# Patient Record
Sex: FEMALE | Race: WHITE | NOT HISPANIC OR LATINO
[De-identification: names, ages, dates, MRNs, and addresses within clinical notes are randomized per-mention and may not be internally consistent; named-entity substitution may affect disease eponyms.]

---

## 2017-09-13 ENCOUNTER — RESULT REVIEW (OUTPATIENT)
Age: 47
End: 2017-09-13

## 2017-09-27 ENCOUNTER — APPOINTMENT (OUTPATIENT)
Dept: UROLOGY | Facility: CLINIC | Age: 47
End: 2017-09-27
Payer: MEDICAID

## 2017-09-27 VITALS
BODY MASS INDEX: 29.25 KG/M2 | HEIGHT: 66 IN | WEIGHT: 182 LBS | TEMPERATURE: 98.4 F | HEART RATE: 88 BPM | DIASTOLIC BLOOD PRESSURE: 79 MMHG | SYSTOLIC BLOOD PRESSURE: 133 MMHG | OXYGEN SATURATION: 98 %

## 2017-09-27 VITALS — SYSTOLIC BLOOD PRESSURE: 153 MMHG | DIASTOLIC BLOOD PRESSURE: 82 MMHG

## 2017-09-27 DIAGNOSIS — Z86.59 PERSONAL HISTORY OF OTHER MENTAL AND BEHAVIORAL DISORDERS: ICD-10-CM

## 2017-09-27 DIAGNOSIS — Z86.39 PERSONAL HISTORY OF OTHER ENDOCRINE, NUTRITIONAL AND METABOLIC DISEASE: ICD-10-CM

## 2017-09-27 DIAGNOSIS — K21.9 GASTRO-ESOPHAGEAL REFLUX DISEASE W/OUT ESOPHAGITIS: ICD-10-CM

## 2017-09-27 PROCEDURE — 99204 OFFICE O/P NEW MOD 45 MIN: CPT

## 2017-09-28 PROBLEM — Z86.39 HISTORY OF HIGH CHOLESTEROL: Status: RESOLVED | Noted: 2017-09-28 | Resolved: 2017-09-28

## 2017-09-28 PROBLEM — Z86.59 HISTORY OF DEPRESSION: Status: RESOLVED | Noted: 2017-09-28 | Resolved: 2017-09-28

## 2017-09-28 PROBLEM — K21.9 CHRONIC GERD: Status: RESOLVED | Noted: 2017-09-28 | Resolved: 2017-09-28

## 2017-10-11 ENCOUNTER — RESULT REVIEW (OUTPATIENT)
Age: 47
End: 2017-10-11

## 2017-11-06 ENCOUNTER — APPOINTMENT (OUTPATIENT)
Dept: UROLOGY | Facility: CLINIC | Age: 47
End: 2017-11-06
Payer: MEDICAID

## 2017-11-06 VITALS
WEIGHT: 182 LBS | BODY MASS INDEX: 29.25 KG/M2 | DIASTOLIC BLOOD PRESSURE: 81 MMHG | SYSTOLIC BLOOD PRESSURE: 146 MMHG | HEIGHT: 66 IN

## 2017-11-06 DIAGNOSIS — R31.9 HEMATURIA, UNSPECIFIED: ICD-10-CM

## 2017-11-06 PROCEDURE — 52000 CYSTOURETHROSCOPY: CPT

## 2017-11-26 ENCOUNTER — FORM ENCOUNTER (OUTPATIENT)
Age: 47
End: 2017-11-26

## 2017-11-27 ENCOUNTER — APPOINTMENT (OUTPATIENT)
Dept: CT IMAGING | Facility: CLINIC | Age: 47
End: 2017-11-27
Payer: MEDICAID

## 2017-11-27 ENCOUNTER — OUTPATIENT (OUTPATIENT)
Dept: OUTPATIENT SERVICES | Facility: HOSPITAL | Age: 47
LOS: 1 days | End: 2017-11-27
Payer: MEDICAID

## 2017-11-27 DIAGNOSIS — Z00.8 ENCOUNTER FOR OTHER GENERAL EXAMINATION: ICD-10-CM

## 2017-11-27 PROCEDURE — 74178 CT ABD&PLV WO CNTR FLWD CNTR: CPT

## 2017-11-27 PROCEDURE — 74178 CT ABD&PLV WO CNTR FLWD CNTR: CPT | Mod: 26

## 2017-11-29 LAB
ANION GAP SERPL CALC-SCNC: 20 MMOL/L
BUN SERPL-MCNC: 14 MG/DL
CALCIUM SERPL-MCNC: 9.4 MG/DL
CHLORIDE SERPL-SCNC: 103 MMOL/L
CO2 SERPL-SCNC: 19 MMOL/L
CORE LAB FLUID CYTOLOGY: NORMAL
CREAT SERPL-MCNC: 1.04 MG/DL
GLUCOSE SERPL-MCNC: 64 MG/DL
HCG UR QL: NEGATIVE
POTASSIUM SERPL-SCNC: 3.4 MMOL/L
SODIUM SERPL-SCNC: 142 MMOL/L

## 2018-08-14 ENCOUNTER — EMERGENCY (EMERGENCY)
Facility: HOSPITAL | Age: 48
LOS: 0 days | Discharge: HOME | End: 2018-08-14
Attending: EMERGENCY MEDICINE | Admitting: EMERGENCY MEDICINE
Payer: MEDICAID

## 2018-08-14 VITALS
HEART RATE: 61 BPM | OXYGEN SATURATION: 95 % | TEMPERATURE: 98 F | RESPIRATION RATE: 18 BRPM | SYSTOLIC BLOOD PRESSURE: 123 MMHG | DIASTOLIC BLOOD PRESSURE: 77 MMHG

## 2018-08-14 VITALS — WEIGHT: 164.91 LBS | HEIGHT: 66 IN

## 2018-08-14 DIAGNOSIS — T23.072A BURN OF UNSPECIFIED DEGREE OF LEFT WRIST, INITIAL ENCOUNTER: ICD-10-CM
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
## 2018-08-14 PROCEDURE — 99283 EMERGENCY DEPT VISIT LOW MDM: CPT | Mod: 25

## 2018-08-14 PROCEDURE — 16000 INITIAL TREATMENT OF BURN(S): CPT

## 2018-08-14 RX ORDER — TETANUS TOXOID, REDUCED DIPHTHERIA TOXOID AND ACELLULAR PERTUSSIS VACCINE, ADSORBED 5; 2.5; 8; 8; 2.5 [IU]/.5ML; [IU]/.5ML; UG/.5ML; UG/.5ML; UG/.5ML
0.5 SUSPENSION INTRAMUSCULAR ONCE
Qty: 0 | Refills: 0 | Status: COMPLETED | OUTPATIENT
Start: 2018-08-14 | End: 2018-08-14

## 2018-08-14 RX ADMIN — TETANUS TOXOID, REDUCED DIPHTHERIA TOXOID AND ACELLULAR PERTUSSIS VACCINE, ADSORBED 0.5 MILLILITER(S): 5; 2.5; 8; 8; 2.5 SUSPENSION INTRAMUSCULAR at 09:38

## 2018-08-14 RX ADMIN — Medication 1 APPLICATION(S): at 09:39

## 2018-08-14 NOTE — ED ADULT NURSE NOTE - NSIMPLEMENTINTERV_GEN_ALL_ED
Implemented All Universal Safety Interventions:  [place] to call system. Call bell, personal items and telephone within reach. Instruct patient to call for assistance. Room bathroom lighting operational. Non-slip footwear when patient is off stretcher. Physically safe environment: no spills, clutter or unnecessary equipment. Stretcher in lowest position, wheels locked, appropriate side rails in place.

## 2018-08-14 NOTE — ED PROVIDER NOTE - MEDICAL DECISION MAKING DETAILS
pt presenting with burn to volar surface of wrist. + blistering, not circumferential, FROM of wrist. no need for burn unit. wash, dress in solvadine, tdap, dc.

## 2018-08-14 NOTE — ED PROVIDER NOTE - OBJECTIVE STATEMENT
Patient is a 48 year old female presenting with burn to left wrist. She notes last night while taking food out of the microwave, she dropped it on her wrist. + blistering and pain to volar surface of wrist. She washed it and put Vaseline on it last night/ + peroxide use this morning. Unknown last tdap. No other injuries. no complaints.

## 2018-08-14 NOTE — ED ADULT NURSE NOTE - OBJECTIVE STATEMENT
Pt presented to ED c/o burn to inner aspect of left wrist and forearm. Yesterday, the pt burned herself while heating up food; treated it at home with vaseline and peroxide without washing it. History of HLD, GERD. Nonblanching redness and intact fluid filled blisters noted to left forearm. Able to move digits freely, no neurovascular compromise. Unknown Tdap status. Denies pain at this time.

## 2018-08-14 NOTE — ED PROVIDER NOTE - SKIN, MLM
LEFT WRIST: + superficial partial thickness burns to volar surface with some open blisters and covered blisters. + ttp. Sensation intact, not circumferential. FROM of wrist.

## 2018-08-14 NOTE — ED ADULT TRIAGE NOTE - CHIEF COMPLAINT QUOTE
Pt states she was heating food last night and dropped on her left wrist, burning her skin, pt put cool water on area, then petroleum jelly, this morning patient used peroxide on the area and came to ED for evaluation. Placed DSD on left wrist and combine upon arrival

## 2019-02-13 ENCOUNTER — RESULT REVIEW (OUTPATIENT)
Age: 49
End: 2019-02-13

## 2019-05-22 NOTE — ED PROVIDER NOTE - CPE EDP MUSC NORM
Problem: Falls - Risk of:  Goal: Will remain free from falls  Description  Will remain free from falls  Outcome: Ongoing  Note:   Pt is wearing the fall bracelet, S.A.F.E. sign is posted outside door, and yellow blanket is on the bed. Pt informed of fall risks, verbalizes understanding, and agrees to ask for help to ambulate. Will monitor. Problem: Serum Glucose Level - Abnormal:  Goal: Ability to maintain appropriate glucose levels will improve  Description  Ability to maintain appropriate glucose levels will improve  Outcome: Ongoing  Note:   Checking sugars ACHS. Pt receiving Lantus BID. Will monitor. normal...

## 2020-01-17 ENCOUNTER — EMERGENCY (EMERGENCY)
Facility: HOSPITAL | Age: 50
LOS: 0 days | Discharge: HOME | End: 2020-01-17
Attending: EMERGENCY MEDICINE
Payer: MEDICARE

## 2020-01-17 VITALS
TEMPERATURE: 98 F | DIASTOLIC BLOOD PRESSURE: 61 MMHG | HEIGHT: 66 IN | WEIGHT: 179.9 LBS | OXYGEN SATURATION: 94 % | SYSTOLIC BLOOD PRESSURE: 115 MMHG | RESPIRATION RATE: 20 BRPM | HEART RATE: 73 BPM

## 2020-01-17 VITALS
TEMPERATURE: 98 F | RESPIRATION RATE: 20 BRPM | OXYGEN SATURATION: 97 % | SYSTOLIC BLOOD PRESSURE: 117 MMHG | HEART RATE: 59 BPM | DIASTOLIC BLOOD PRESSURE: 82 MMHG

## 2020-01-17 DIAGNOSIS — K21.9 GASTRO-ESOPHAGEAL REFLUX DISEASE WITHOUT ESOPHAGITIS: ICD-10-CM

## 2020-01-17 DIAGNOSIS — M79.661 PAIN IN RIGHT LOWER LEG: ICD-10-CM

## 2020-01-17 DIAGNOSIS — M79.89 OTHER SPECIFIED SOFT TISSUE DISORDERS: ICD-10-CM

## 2020-01-17 DIAGNOSIS — I10 ESSENTIAL (PRIMARY) HYPERTENSION: ICD-10-CM

## 2020-01-17 DIAGNOSIS — M25.561 PAIN IN RIGHT KNEE: ICD-10-CM

## 2020-01-17 PROCEDURE — 99284 EMERGENCY DEPT VISIT MOD MDM: CPT

## 2020-01-17 PROCEDURE — 73562 X-RAY EXAM OF KNEE 3: CPT | Mod: RT

## 2020-01-17 PROCEDURE — 93971 EXTREMITY STUDY: CPT | Mod: RT

## 2020-01-17 PROCEDURE — 73562 X-RAY EXAM OF KNEE 3: CPT | Mod: 26,RT

## 2020-01-17 PROCEDURE — 93971 EXTREMITY STUDY: CPT | Mod: 26,RT

## 2020-01-17 PROCEDURE — 99284 EMERGENCY DEPT VISIT MOD MDM: CPT | Mod: 25

## 2020-01-17 RX ORDER — ACETAMINOPHEN 500 MG
975 TABLET ORAL ONCE
Refills: 0 | Status: COMPLETED | OUTPATIENT
Start: 2020-01-17 | End: 2020-01-17

## 2020-01-17 RX ORDER — IBUPROFEN 200 MG
1 TABLET ORAL
Qty: 10 | Refills: 0
Start: 2020-01-17 | End: 2020-01-21

## 2020-01-17 RX ORDER — IBUPROFEN 200 MG
600 TABLET ORAL ONCE
Refills: 0 | Status: COMPLETED | OUTPATIENT
Start: 2020-01-17 | End: 2020-01-17

## 2020-01-17 RX ADMIN — Medication 600 MILLIGRAM(S): at 22:38

## 2020-01-17 RX ADMIN — Medication 975 MILLIGRAM(S): at 22:38

## 2020-01-17 NOTE — ED STATDOCS - ATTENDING CONTRIBUTION TO CARE
I, Anthony Aguirre MD,  performed the initial face to face bedside interview with this patient regarding history of present illness, review of symptoms and relevant past medical, social and family history.  I completed an independent physical examination.  I was the initial provider who evaluated this patient. I have signed out the follow up of any pending tests (i.e. labs, radiological studies) to the ACP.  I have communicated the patient’s plan of care and disposition with the ACP.

## 2020-01-17 NOTE — ED STATDOCS - PATIENT PORTAL LINK FT
You can access the FollowMyHealth Patient Portal offered by [facility] by registering at the following website: [email or web address]. By joining [employer]’s FollowMyHealth portal, you will also be able to view your health information using other applications (apps) compatible with our system.

## 2020-01-17 NOTE — ED STATDOCS - PROGRESS NOTE DETAILS
Patient seen and evaluated in intake with ED Attending,  ED attending note and orders reviewed, will continue with patient follow up and care -Niurka Mosqueda PA-C pt aware of xray and us results will fu with her pmd knows to RICE leg and return to ed for any worsening of symptoms. pt agrees with plan and well appearing on dc ambulating in ed. -Niurka Mosqueda PA-C

## 2020-01-17 NOTE — ED STATDOCS - PHYSICAL EXAMINATION
*GEN: NAD; well appearing; A+O x3   *HEAD: NC/AT   *EYES/NOSE: PERRL & EOMI b/l  *THROAT: airway patent, moist mucous membranes  *NECK: Neck supple, no masses  *PULMONARY: CTA b/l, symmetric breath sounds.   *CARDIAC: s1s2, regular rhythm, no Murmur  *ABDOMEN:  ND, NT, soft, no guarding, no rebound, no masses   *BACK: no CVA tenderness, Normal  spine   *EXTREMITIES: symmetric pulses, 2+ dp & radial pulses, capillary refill < 2 seconds, no cyanosis, +RLE edema, nonpitting. No point bony TTP   *SKIN: no rash or bruising   *NEUROLOGIC: alert, CN 2-12 intact, moves all 4 extremities, full active & passive ROM in all extremities, normal baseline gait  *PSYCH: insight and judgment nl, memory nl, affect nl, thought nl

## 2020-01-17 NOTE — ED STATDOCS - NS ED ROS FT
Review of Systems:  	•	CONSTITUTIONAL: no fever  	•	SKIN: no rash  	•	RESPIRATORY: no shortness of breath  	•	CARDIAC: no chest pain, no palpitations  	•	GI:  no abd pain, no nausea, no vomiting, no diarrhea  	•	GENITO-URINARY:  no dysuria; no hematuria    	•	MUSCULOSKELETAL:  no back pain +right leg pain and swelling   	•	NEUROLOGIC: no weakness  	•	ALLERGY: no rhinitis  	•	PSYSCHIATRIC: no anxiety

## 2020-01-17 NOTE — ED STATDOCS - CARE PLAN
Principal Discharge DX:	Leg swelling Principal Discharge DX:	Leg swelling  Secondary Diagnosis:	Knee pain, right

## 2020-01-17 NOTE — ED ADULT NURSE NOTE - CHIEF COMPLAINT QUOTE
pt presents to the ED with right lower extremity swelling and pain, pt states she is unsure of when the pain started, pt has no other complaints at this time, pt denies taking any meds PTA

## 2020-01-17 NOTE — ED STATDOCS - CLINICAL SUMMARY MEDICAL DECISION MAKING FREE TEXT BOX
RLE swelling and pain. Will eval for DVT. Possible joint effusion due to arthritis. Will get x-ray. At this point no suspicion for cellulitis, septic arthritis, PE. RLE swelling and pain. Will eval for DVT. Possible joint effusion due to arthritis vs amlodipine side effect. Will get x-ray. At this point no suspicion for cellulitis, septic arthritis, PE.

## 2020-01-17 NOTE — ED ADULT NURSE NOTE - NSIMPLEMENTINTERV_GEN_ALL_ED
Implemented All Universal Safety Interventions:  [place] to call system. Call bell, personal items and telephone within reach. Instruct patient to call for assistance. Room bathroom lighting operational. Non-slip footwear when patient is off stretcher. Physically safe environment: no spills, clutter or unnecessary equipment. Stretcher in lowest position, wheels locked, appropriate side rails in place.

## 2020-01-17 NOTE — ED STATDOCS - OBJECTIVE STATEMENT
Pertinent HPI/PMH/PSH/FHx/SHx and Review of Systems contained within  HPI: 50yo female p/w CC right lower leg pain and swelling for a few days, worsening since onset.  Worse with weight bearing. Denies h/o DVT. Denies recent travel. No pain meds PTA.   PMH/PSH relevant for: HTN on amlodipine, GERD   ROS negative for: fever, Chest pain, SOB, Nausea, vomiting, diarrhea, abdominal pain, dysuria, SOB   FamilyHx and SocialHx not otherwise contributory

## 2020-01-18 PROBLEM — I10 ESSENTIAL (PRIMARY) HYPERTENSION: Chronic | Status: ACTIVE | Noted: 2018-08-14

## 2020-02-25 ENCOUNTER — RESULT REVIEW (OUTPATIENT)
Age: 50
End: 2020-02-25

## 2021-05-18 ENCOUNTER — RESULT REVIEW (OUTPATIENT)
Age: 51
End: 2021-05-18

## 2021-05-20 PROBLEM — K21.9 GASTRO-ESOPHAGEAL REFLUX DISEASE WITHOUT ESOPHAGITIS: Chronic | Status: ACTIVE | Noted: 2020-01-18

## 2021-06-03 ENCOUNTER — OUTPATIENT (OUTPATIENT)
Dept: OUTPATIENT SERVICES | Facility: HOSPITAL | Age: 51
LOS: 1 days | End: 2021-06-03
Payer: MEDICARE

## 2021-06-03 ENCOUNTER — APPOINTMENT (OUTPATIENT)
Dept: ULTRASOUND IMAGING | Facility: CLINIC | Age: 51
End: 2021-06-03
Payer: MEDICARE

## 2021-06-03 DIAGNOSIS — Z01.419 ENCOUNTER FOR GYNECOLOGICAL EXAMINATION (GENERAL) (ROUTINE) WITHOUT ABNORMAL FINDINGS: ICD-10-CM

## 2021-06-03 PROCEDURE — 76856 US EXAM PELVIC COMPLETE: CPT

## 2021-06-03 PROCEDURE — 76856 US EXAM PELVIC COMPLETE: CPT | Mod: 26

## 2021-07-05 ENCOUNTER — APPOINTMENT (OUTPATIENT)
Dept: MRI IMAGING | Facility: CLINIC | Age: 51
End: 2021-07-05
Payer: MEDICARE

## 2021-07-05 ENCOUNTER — OUTPATIENT (OUTPATIENT)
Dept: OUTPATIENT SERVICES | Facility: HOSPITAL | Age: 51
LOS: 1 days | End: 2021-07-05
Payer: MEDICARE

## 2021-07-05 DIAGNOSIS — Z00.8 ENCOUNTER FOR OTHER GENERAL EXAMINATION: ICD-10-CM

## 2021-07-05 PROCEDURE — 72197 MRI PELVIS W/O & W/DYE: CPT | Mod: 26,MH

## 2021-07-05 PROCEDURE — A9585: CPT

## 2021-07-05 PROCEDURE — 72197 MRI PELVIS W/O & W/DYE: CPT

## 2021-08-27 ENCOUNTER — APPOINTMENT (OUTPATIENT)
Age: 51
End: 2021-08-27
Payer: MEDICARE

## 2021-08-27 PROCEDURE — 0001A: CPT

## 2021-09-17 ENCOUNTER — APPOINTMENT (OUTPATIENT)
Age: 51
End: 2021-09-17
Payer: MEDICARE

## 2021-09-17 PROCEDURE — 0002A: CPT

## 2022-07-21 NOTE — ED ADULT TRIAGE NOTE - HEIGHT IN CM
Tip: Hydropeel Tip, Teal Location: face Procedure: Exfoliation Number Of Passes Step 6: 0 Solution Override Procedure: Extraction Tip Override Solution: Activ-4 Tip: Hydropeel Tip, Clear Procedure: Extend and Protect Procedure: Peel Solution: Beta-HD Price (Use Numbers Only, No Special Characters Or $): 185 Procedure: Fusion Indication: dehydrated skin Procedure: Boost Consent: Written consent obtained, risks reviewed including but not limited to crusting, scabbing, blistering, scarring, darker or lighter pigmentary change, bruising, and/or incomplete response. Treatment Number: 1 Tip: Hydropeel Tip, Blue Post-Care Instructions: I reviewed with the patient in detail post-care instructions. Patient should stay away from the sun and wear sun protection until treated areas are fully healed. Solution: GlySal 7.5% 167.64

## 2022-11-03 ENCOUNTER — OFFICE (OUTPATIENT)
Dept: URBAN - METROPOLITAN AREA CLINIC 102 | Facility: CLINIC | Age: 52
Setting detail: OPHTHALMOLOGY
End: 2022-11-03
Payer: MEDICARE

## 2022-11-03 DIAGNOSIS — H40.033: ICD-10-CM

## 2022-11-03 PROBLEM — H52.7 REFRACTIVE ERROR: Status: ACTIVE | Noted: 2022-11-03

## 2022-11-03 PROCEDURE — 92020 GONIOSCOPY: CPT | Performed by: OPHTHALMOLOGY

## 2022-11-03 PROCEDURE — 92004 COMPRE OPH EXAM NEW PT 1/>: CPT | Performed by: OPHTHALMOLOGY

## 2022-11-03 ASSESSMENT — AXIALLENGTH_DERIVED
OD_AL: 22.8638
OS_AL: 23.2275

## 2022-11-03 ASSESSMENT — VISUAL ACUITY
OD_BCVA: 20/20
OS_BCVA: 20/30

## 2022-11-03 ASSESSMENT — REFRACTION_AUTOREFRACTION
OS_SPHERE: +0.25
OS_CYLINDER: -0.25
OD_CYLINDER: -1.50
OD_SPHERE: +1.50
OD_AXIS: 092
OS_AXIS: 056

## 2022-11-03 ASSESSMENT — CONFRONTATIONAL VISUAL FIELD TEST (CVF)
OS_FINDINGS: FULL
OD_FINDINGS: FULL

## 2022-11-03 ASSESSMENT — SPHEQUIV_DERIVED
OD_SPHEQUIV: 0.75
OS_SPHEQUIV: 0.125

## 2022-11-03 ASSESSMENT — KERATOMETRY
METHOD_AUTO_MANUAL: AUTO
OS_K2POWER_DIOPTERS: 44.50
OD_K2POWER_DIOPTERS: 45.50
OD_K1POWER_DIOPTERS: 44.00
OS_K1POWER_DIOPTERS: 44.25
OD_AXISANGLE_DEGREES: 003
OS_AXISANGLE_DEGREES: 119

## 2022-11-03 ASSESSMENT — TONOMETRY
OS_IOP_MMHG: 13
OD_IOP_MMHG: 13

## 2023-11-01 NOTE — ED PROVIDER NOTE - PSYCHIATRIC, MLM
No assistance needed Alert and oriented to person, place, time/situation. normal mood and affect. no apparent risk to self or others.

## 2024-04-16 ENCOUNTER — OFFICE (OUTPATIENT)
Dept: URBAN - METROPOLITAN AREA CLINIC 102 | Facility: CLINIC | Age: 54
Setting detail: OPHTHALMOLOGY
End: 2024-04-16
Payer: MEDICARE

## 2024-04-16 DIAGNOSIS — H40.033: ICD-10-CM

## 2024-04-16 PROCEDURE — 92020 GONIOSCOPY: CPT | Performed by: OPHTHALMOLOGY

## 2024-04-16 PROCEDURE — 92014 COMPRE OPH EXAM EST PT 1/>: CPT | Performed by: OPHTHALMOLOGY

## 2024-07-02 ENCOUNTER — RESULT REVIEW (OUTPATIENT)
Age: 54
End: 2024-07-02

## 2025-04-22 ENCOUNTER — OFFICE (OUTPATIENT)
Dept: URBAN - METROPOLITAN AREA CLINIC 102 | Facility: CLINIC | Age: 55
Setting detail: OPHTHALMOLOGY
End: 2025-04-22
Payer: MEDICARE

## 2025-04-22 DIAGNOSIS — H25.13: ICD-10-CM

## 2025-04-22 DIAGNOSIS — H40.033: ICD-10-CM

## 2025-04-22 PROCEDURE — 92020 GONIOSCOPY: CPT | Performed by: OPHTHALMOLOGY

## 2025-04-22 PROCEDURE — 92014 COMPRE OPH EXAM EST PT 1/>: CPT | Performed by: OPHTHALMOLOGY

## 2025-04-22 ASSESSMENT — REFRACTION_AUTOREFRACTION
OD_SPHERE: +2.25
OS_SPHERE: +1.75
OD_CYLINDER: -2.00
OS_CYLINDER: -1.50
OD_AXIS: 091
OS_AXIS: 077

## 2025-04-22 ASSESSMENT — KERATOMETRY
METHOD_AUTO_MANUAL: AUTO
OD_K2POWER_DIOPTERS: 45.75
OS_K1POWER_DIOPTERS: 43.75
OD_K1POWER_DIOPTERS: 44.00
OS_K2POWER_DIOPTERS: 44.50
OD_AXISANGLE_DEGREES: 179
OS_AXISANGLE_DEGREES: 162

## 2025-04-22 ASSESSMENT — CONFRONTATIONAL VISUAL FIELD TEST (CVF)
OS_FINDINGS: FULL
OD_FINDINGS: FULL

## 2025-04-22 ASSESSMENT — VISUAL ACUITY
OS_BCVA: 20/25-2
OD_BCVA: 20/20-1

## 2025-04-22 ASSESSMENT — TONOMETRY
OD_IOP_MMHG: 13
OS_IOP_MMHG: 14